# Patient Record
Sex: FEMALE | Race: WHITE | NOT HISPANIC OR LATINO | Employment: UNEMPLOYED | URBAN - METROPOLITAN AREA
[De-identification: names, ages, dates, MRNs, and addresses within clinical notes are randomized per-mention and may not be internally consistent; named-entity substitution may affect disease eponyms.]

---

## 2025-02-10 DIAGNOSIS — K91.2 POSTSURGICAL MALABSORPTION: ICD-10-CM

## 2025-02-10 DIAGNOSIS — E66.811 OBESITY, CLASS I, BMI 30-34.9: ICD-10-CM

## 2025-02-10 DIAGNOSIS — E66.01 MORBID (SEVERE) OBESITY DUE TO EXCESS CALORIES (HCC): ICD-10-CM

## 2025-02-10 DIAGNOSIS — R63.5 ABNORMAL WEIGHT GAIN: Primary | ICD-10-CM

## 2025-02-19 ENCOUNTER — OFFICE VISIT (OUTPATIENT)
Dept: BARIATRICS | Facility: CLINIC | Age: 40
End: 2025-02-19
Payer: COMMERCIAL

## 2025-02-19 VITALS
WEIGHT: 170.2 LBS | HEIGHT: 66 IN | OXYGEN SATURATION: 98 % | DIASTOLIC BLOOD PRESSURE: 60 MMHG | HEART RATE: 96 BPM | BODY MASS INDEX: 27.35 KG/M2 | SYSTOLIC BLOOD PRESSURE: 110 MMHG

## 2025-02-19 DIAGNOSIS — R63.5 WEIGHT GAIN FOLLOWING GASTRIC BYPASS SURGERY: ICD-10-CM

## 2025-02-19 DIAGNOSIS — Z98.84 WEIGHT GAIN FOLLOWING GASTRIC BYPASS SURGERY: ICD-10-CM

## 2025-02-19 DIAGNOSIS — Z98.84 BARIATRIC SURGERY STATUS: ICD-10-CM

## 2025-02-19 DIAGNOSIS — K91.2 POSTSURGICAL MALABSORPTION: ICD-10-CM

## 2025-02-19 DIAGNOSIS — G43.909 MIGRAINES: ICD-10-CM

## 2025-02-19 DIAGNOSIS — Z48.815 ENCOUNTER FOR SURGICAL AFTERCARE FOLLOWING SURGERY OF DIGESTIVE SYSTEM: Primary | ICD-10-CM

## 2025-02-19 PROCEDURE — 99214 OFFICE O/P EST MOD 30 MIN: CPT | Performed by: PHYSICIAN ASSISTANT

## 2025-02-19 RX ORDER — AMITRIPTYLINE HYDROCHLORIDE 50 MG/1
50 TABLET ORAL
COMMUNITY

## 2025-02-19 RX ORDER — GABAPENTIN 100 MG/1
100 CAPSULE ORAL DAILY
COMMUNITY
Start: 2024-11-25

## 2025-02-19 RX ORDER — CETIRIZINE HYDROCHLORIDE 10 MG/1
10 TABLET ORAL DAILY
COMMUNITY

## 2025-02-19 RX ORDER — FLUTICASONE PROPIONATE 50 MCG
1 SPRAY, SUSPENSION (ML) NASAL DAILY
COMMUNITY

## 2025-02-19 RX ORDER — MULTIVITAMIN
1 TABLET ORAL DAILY
COMMUNITY

## 2025-02-19 RX ORDER — TIZANIDINE HYDROCHLORIDE 4 MG/1
4 CAPSULE, GELATIN COATED ORAL 3 TIMES DAILY
COMMUNITY

## 2025-02-19 RX ORDER — OMEGA-3 FATTY ACIDS/FISH OIL 300-1000MG
600 CAPSULE ORAL
COMMUNITY

## 2025-02-19 RX ORDER — TOPIRAMATE 25 MG/1
25 TABLET, FILM COATED ORAL 2 TIMES DAILY
Qty: 180 TABLET | Refills: 0 | Status: SHIPPED | OUTPATIENT
Start: 2025-02-19 | End: 2025-05-20

## 2025-02-19 RX ORDER — GABAPENTIN 300 MG/1
1 CAPSULE ORAL DAILY
COMMUNITY
Start: 2024-12-27

## 2025-02-19 NOTE — ASSESSMENT & PLAN NOTE
-At risk for malabsorption of vitamins/minerals secondary to malabsorption and restriction of intake from bariatric surgery  -NOT Currently taking adequate postop bariatric surgery vitamin supplementation: OTC MVI with 9mg iron, - recommend change to Keith MVI with 45mg iron and calcium citrate 500mg TID (caution with too much oral iron d/t hx of constipation)    Hx of iron infusions    -Obtain CBC/Metabolic panel  -Patient received education about the importance of adhering to a lifelong supplementation regimen to avoid vitamin/mineral deficiencies

## 2025-02-19 NOTE — ASSESSMENT & PLAN NOTE
-s/p Moustapha-En-Y Gastric Bypass with in [place] in 2010 and s/p dx lap and reduction of internal hernia with Dr. Lua in 09/2024. Patient is struggling with weight regain and ready to get back on track.  They will work on diet and lifestyle changes - especially 30/60 rule, avoid mindless snacking, increase protein and fiber, avoid sugary drinks, avoid meal skipping, track/log, and exercise. Recommend consult with surgical RD, LCSW, and MWM. UGI to r/o GGF and evaluate anatomy.     Will trial Topamax given hx of Migraines and HS hunger.    Initial: 275lbs  Current: 170lbs  Oziel: 148lbs  Current BMI is Body mass index is 27.89 kg/m².    Tolerating a regular diet-yes  Eating at least 60 grams of protein per day-yes  Following 30/60 minute rule with liquids-yes  Drinking at least 64 ounces of fluid per day-no  Drinking carbonated beverages-no  Sufficient exercise-limited  Using NSAIDs regularly-yes - advised her of risks of Ulcers/perorated ulcer and to stop all use of NSAIDS  Using nicotine-no  Using alcohol-yes. Advised about the risks of alcohol s/p bariatric surgery and recommend avoiding all alcohol    Topiramate Instructions:  - Begin with 25mg once daily in the evening for the 1st week then start taking 25mg in the morning and evening  - Take medication at the same time everyday.  - Stay well hydrated to avoid overheating  - May reduce the effectiveness of hormonal birth control - backup method such as condoms recommended to avoid pregnancy    Reviewed the potential side effects of topiramate (Topamax) which may include - numbness or tingling, difficulty with word finding, metabolic acidosis, occurrence of gallstones and kidney stones, glaucoma, fatigue, upper respiratory infection symptoms, depression/anxiety, changes in taste, abdominal upset/heartburn, and trouble sleeping

## 2025-02-19 NOTE — PROGRESS NOTES
Assessment/Plan:    Encounter for surgical aftercare following surgery of digestive system  -s/p Moustapha-En-Y Gastric Bypass with in [place] in 2010 and s/p dx lap and reduction of internal hernia with Dr. Lua in 09/2024. Patient is struggling with weight regain and ready to get back on track.  They will work on diet and lifestyle changes - especially 30/60 rule, avoid mindless snacking, increase protein and fiber, avoid sugary drinks, avoid meal skipping, track/log, and exercise. Recommend consult with surgical RD, LCSW, and MWM. UGI to r/o GGF and evaluate anatomy.     Will trial Topamax given hx of Migraines and HS hunger.    Initial: 275lbs  Current: 170lbs  Oziel: 148lbs  Current BMI is Body mass index is 27.89 kg/m².    Tolerating a regular diet-yes  Eating at least 60 grams of protein per day-yes  Following 30/60 minute rule with liquids-yes  Drinking at least 64 ounces of fluid per day-no  Drinking carbonated beverages-no  Sufficient exercise-limited  Using NSAIDs regularly-yes - advised her of risks of Ulcers/perorated ulcer and to stop all use of NSAIDS  Using nicotine-no  Using alcohol-yes. Advised about the risks of alcohol s/p bariatric surgery and recommend avoiding all alcohol    Topiramate Instructions:  - Begin with 25mg once daily in the evening for the 1st week then start taking 25mg in the morning and evening  - Take medication at the same time everyday.  - Stay well hydrated to avoid overheating  - May reduce the effectiveness of hormonal birth control - backup method such as condoms recommended to avoid pregnancy    Reviewed the potential side effects of topiramate (Topamax) which may include - numbness or tingling, difficulty with word finding, metabolic acidosis, occurrence of gallstones and kidney stones, glaucoma, fatigue, upper respiratory infection symptoms, depression/anxiety, changes in taste, abdominal upset/heartburn, and trouble sleeping      Postsurgical malabsorption  -At risk for  malabsorption of vitamins/minerals secondary to malabsorption and restriction of intake from bariatric surgery  -NOT Currently taking adequate postop bariatric surgery vitamin supplementation: OTC MVI with 9mg iron, - recommend change to Keith MVI with 45mg iron and calcium citrate 500mg TID (caution with too much oral iron d/t hx of constipation)    Hx of iron infusions    -Obtain CBC/Metabolic panel  -Patient received education about the importance of adhering to a lifelong supplementation regimen to avoid vitamin/mineral deficiencies       Migraines  -on amitriptyline   -Will trial Topamax for HS hunger        Diagnoses and all orders for this visit:    Encounter for surgical aftercare following surgery of digestive system    Postsurgical malabsorption    Bariatric surgery status  -     ECG 12 lead; Future    Weight gain following gastric bypass surgery  -     [place] UPPER GI UGI; Future    Migraines    Other orders  -     Ascorbic Acid (VITAMIN C PO); Take 250 mg by mouth 2 (two) times a day  -     amitriptyline (ELAVIL) 50 mg tablet; Take 50 mg by mouth daily at bedtime  -     fluticasone (FLONASE) 50 mcg/act nasal spray; 1 spray into each nostril daily  -     gabapentin (NEURONTIN) 100 mg capsule; Take 100 mg by mouth daily  -     gabapentin (NEURONTIN) 300 mg capsule; Take 1 capsule by mouth in the morning  -     Ibuprofen 200 MG CAPS; Take 600 mg by mouth  -     Multiple Vitamin (multivitamin) tablet; Take 1 tablet by mouth daily  -     cetirizine (ZyrTEC) 10 mg tablet; Take 10 mg by mouth daily  -     TiZANidine (ZANAFLEX) 4 MG capsule; Take 4 mg by mouth 3 (three) times a day  -     MAGNESIUM CITRATE PO; Take by mouth  -     Probiotic Product (PROBIOTIC BLEND PO); Take by mouth  -     VITAMIN D, CHOLECALCIFEROL, PO; Take by mouth  -     Docusate Calcium (STOOL SOFTENER PO); Take by mouth  -     patient supplied medication; Colagen & peptides  -     ELDERBERRY PO; Take by mouth  -     Multiple Vitamins-Minerals  (AIRBORNE ELDERBERRY PO); Take by mouth  -     TURMERIC PO; Take by mouth          Subjective:      Patient ID: Holly Duran is a 39 y.o. female.    -s/p Moustapha-En-Y Gastric Bypass with in [place] in 2010 and s/p dx lap and reduction of internal hernia with Dr. Lua in 09/2024. Presents to the office today to establish care and for weight regain. Tolerating diet without issues; denies N/V, dysphagia, reflux. She lost 127lbs s/p surgery and maintained this weight loss until she had 2 children and now up 22lbs from her radha. Despite eating healthy and tracking/logging she has been unable to lose weight. She is limited with exercise d/t ankle injury. She experiences hunger worse at night.     Hx of IBS - constipation well controlled now with HS natural calm magnesium.    She has migraine headaches - on amitriptyline. Patient denies personal history of kidney stones, galucoma, or pancreatitis. Patient also denies personal and family history of medullary thyroid cancer and multiple endocrine neoplasia type 2 (MEN 2 tumor).       Diet Recall:   B - 2-3 cups coffee with half/half and stevia  Snack - egg and cheese  L - protein bar or green salad with chicken or tuna   Snack - almonds handful or berries or Greek yogurt  D - Tacos with ground meat and cheese or grilled chicken wrap and Bengali onion soup  HS - SF ice cream and scoop of PB    Fluids - 32oz water, 2-3 cups coffee, glass wine or mixed drinks few times a week    The following portions of the patient's history were reviewed and updated as appropriate: allergies, current medications, past family history, past medical history, past social history, past surgical history and problem list.    Review of Systems   Constitutional:  Positive for unexpected weight change (weight regain). Negative for chills and fever.   HENT:  Negative for trouble swallowing.    Respiratory:  Negative for cough and shortness of breath.    Cardiovascular:  Negative for chest pain and  "palpitations.   Gastrointestinal:  Negative for abdominal pain, constipation, diarrhea, nausea and vomiting.   Musculoskeletal:  Positive for arthralgias.   Neurological:  Negative for dizziness.   Psychiatric/Behavioral:          Denies anxiety and depression         Objective:      /60 (BP Location: Right arm, Patient Position: Sitting, Cuff Size: Standard)   Pulse 96   Ht 5' 5.5\" (1.664 m)   Wt 77.2 kg (170 lb 3.2 oz)   LMP 01/14/2025 (Exact Date)   SpO2 98%   BMI 27.89 kg/m²     Colonoscopy-Not applicable       Physical Exam  Vitals reviewed.   Constitutional:       General: She is not in acute distress.     Appearance: She is well-developed.   HENT:      Head: Normocephalic and atraumatic.   Eyes:      General: No scleral icterus.  Cardiovascular:      Rate and Rhythm: Normal rate and regular rhythm.   Pulmonary:      Effort: Pulmonary effort is normal. No respiratory distress.   Abdominal:      General: There is no distension.      Palpations: Abdomen is soft.      Tenderness: There is no abdominal tenderness.   Skin:     General: Skin is warm and dry.   Neurological:      Mental Status: She is alert.   Psychiatric:         Mood and Affect: Mood normal.         Behavior: Behavior normal.           BARRIERS: none identified    GOALS:   Continued/Maintain healthy weight loss with good nutrition intakes.  Adequate hydration with at least 64oz. fluid intake.  Normal vitamin and mineral levels.  Exercise as tolerated.    Follow-up in 1 year. We kindly ask that your arrive 15 minutes before your scheduled appointment time with your provider to allow our staff to room you, get your vital signs and update your chart.    Follow diet as discussed.      Get lab work done in the next 2 weeks.  You have been given a lab slip today.  Please call the office if you need a replacement.  It is recommended to check with your insurance BEFORE getting labs done to make sure they are covered by your policy.  Also, " please check with your PCP and other providers before getting labs to avoid duplicate labs. Make sure to HOLD any multivitamins that may contain biotin and any biotin supplements FOR 5 DAYS before any labs since it can affect the results.    Follow vitamin and mineral recommendations as reviewed with you.    Call our office if you have any problems with abdominal pain especially associated with fever, chills, nausea, vomiting or any other concerns.    All  Post-bariatric surgery patients should be aware that very small quantities of any alcohol can cause impairment and it is very possible not to feel the effect. The effect can be in the system for several hours.  It is also a stomach irritant.     It is advised to AVOID alcohol, Nonsteroidal antiinflammatory drugs (NSAIDS) and nicotine of all forms . Any of these can cause stomach irritation/pain.

## 2025-02-28 ENCOUNTER — LAB (OUTPATIENT)
Dept: LAB | Facility: HOSPITAL | Age: 40
End: 2025-02-28
Payer: COMMERCIAL

## 2025-02-28 ENCOUNTER — APPOINTMENT (OUTPATIENT)
Dept: LAB | Facility: HOSPITAL | Age: 40
End: 2025-02-28
Payer: COMMERCIAL

## 2025-02-28 ENCOUNTER — RESULTS FOLLOW-UP (OUTPATIENT)
Dept: BARIATRICS | Facility: CLINIC | Age: 40
End: 2025-02-28

## 2025-02-28 ENCOUNTER — HOSPITAL ENCOUNTER (OUTPATIENT)
Dept: RADIOLOGY | Facility: HOSPITAL | Age: 40
Discharge: HOME | End: 2025-02-28
Payer: COMMERCIAL

## 2025-02-28 DIAGNOSIS — E61.1 IRON DEFICIENCY: ICD-10-CM

## 2025-02-28 DIAGNOSIS — Z98.84 BARIATRIC SURGERY STATUS: ICD-10-CM

## 2025-02-28 DIAGNOSIS — K91.2 POSTSURGICAL MALABSORPTION: Primary | ICD-10-CM

## 2025-02-28 DIAGNOSIS — K91.2 POSTSURGICAL MALABSORPTION: ICD-10-CM

## 2025-02-28 DIAGNOSIS — E66.811 OBESITY, CLASS I, BMI 30-34.9: ICD-10-CM

## 2025-02-28 DIAGNOSIS — Z98.84 WEIGHT GAIN FOLLOWING GASTRIC BYPASS SURGERY: ICD-10-CM

## 2025-02-28 DIAGNOSIS — R63.5 WEIGHT GAIN FOLLOWING GASTRIC BYPASS SURGERY: ICD-10-CM

## 2025-02-28 LAB
25(OH)D3 SERPL-MCNC: 34.4 NG/ML (ref 30–100)
ALBUMIN SERPL BCG-MCNC: 4.4 G/DL (ref 3.5–5)
ALP SERPL-CCNC: 53 U/L (ref 34–104)
ALT SERPL W P-5'-P-CCNC: 15 U/L (ref 7–52)
ANION GAP SERPL CALCULATED.3IONS-SCNC: 10 MMOL/L (ref 4–13)
AST SERPL W P-5'-P-CCNC: 20 U/L (ref 13–39)
BASOPHILS # BLD AUTO: 0.06 THOUSANDS/ÂΜL (ref 0–0.1)
BASOPHILS NFR BLD AUTO: 1 % (ref 0–1)
BILIRUB SERPL-MCNC: 0.36 MG/DL (ref 0.2–1)
BUN SERPL-MCNC: 22 MG/DL (ref 5–25)
CALCIUM SERPL-MCNC: 8.9 MG/DL (ref 8.4–10.2)
CHLORIDE SERPL-SCNC: 105 MMOL/L (ref 96–108)
CHOLEST SERPL-MCNC: 196 MG/DL (ref ?–200)
CO2 SERPL-SCNC: 24 MMOL/L (ref 21–32)
CREAT SERPL-MCNC: 0.58 MG/DL (ref 0.6–1.3)
EOSINOPHIL # BLD AUTO: 0.12 THOUSAND/ÂΜL (ref 0–0.61)
EOSINOPHIL NFR BLD AUTO: 2 % (ref 0–6)
ERYTHROCYTE [DISTWIDTH] IN BLOOD BY AUTOMATED COUNT: 13.8 % (ref 11.6–15.1)
EST. AVERAGE GLUCOSE BLD GHB EST-MCNC: 120 MG/DL
FERRITIN SERPL-MCNC: 7 NG/ML (ref 11–307)
FOLATE SERPL-MCNC: >22.3 NG/ML
GFR SERPL CREATININE-BSD FRML MDRD: 118 ML/MIN/1.73SQ M
GLUCOSE P FAST SERPL-MCNC: 84 MG/DL (ref 65–99)
HBA1C MFR BLD: 5.8 %
HCT VFR BLD AUTO: 42.3 % (ref 34.8–46.1)
HDLC SERPL-MCNC: 82 MG/DL
HGB BLD-MCNC: 13.4 G/DL (ref 11.5–15.4)
IMM GRANULOCYTES # BLD AUTO: 0.01 THOUSAND/UL (ref 0–0.2)
IMM GRANULOCYTES NFR BLD AUTO: 0 % (ref 0–2)
IRON SATN MFR SERPL: 19 % (ref 15–50)
IRON SERPL-MCNC: 89 UG/DL (ref 50–212)
LDLC SERPL CALC-MCNC: 106 MG/DL (ref 0–100)
LYMPHOCYTES # BLD AUTO: 1.22 THOUSANDS/ÂΜL (ref 0.6–4.47)
LYMPHOCYTES NFR BLD AUTO: 21 % (ref 14–44)
MCH RBC QN AUTO: 29.1 PG (ref 26.8–34.3)
MCHC RBC AUTO-ENTMCNC: 31.7 G/DL (ref 31.4–37.4)
MCV RBC AUTO: 92 FL (ref 82–98)
MONOCYTES # BLD AUTO: 0.46 THOUSAND/ÂΜL (ref 0.17–1.22)
MONOCYTES NFR BLD AUTO: 8 % (ref 4–12)
NEUTROPHILS # BLD AUTO: 4.02 THOUSANDS/ÂΜL (ref 1.85–7.62)
NEUTS SEG NFR BLD AUTO: 68 % (ref 43–75)
NONHDLC SERPL-MCNC: 114 MG/DL
NRBC BLD AUTO-RTO: 0 /100 WBCS
PLATELET # BLD AUTO: 255 THOUSANDS/UL (ref 149–390)
PMV BLD AUTO: 11.1 FL (ref 8.9–12.7)
POTASSIUM SERPL-SCNC: 4.3 MMOL/L (ref 3.5–5.3)
PROT SERPL-MCNC: 6.9 G/DL (ref 6.4–8.4)
PTH-INTACT SERPL-MCNC: 36.9 PG/ML (ref 12–88)
RBC # BLD AUTO: 4.6 MILLION/UL (ref 3.81–5.12)
SODIUM SERPL-SCNC: 139 MMOL/L (ref 135–147)
TIBC SERPL-MCNC: 471.8 UG/DL (ref 250–450)
TRANSFERRIN SERPL-MCNC: 337 MG/DL (ref 203–362)
TRIGL SERPL-MCNC: 42 MG/DL (ref ?–150)
TSH SERPL DL<=0.05 MIU/L-ACNC: 2.88 UIU/ML (ref 0.45–4.5)
UIBC SERPL-MCNC: 383 UG/DL (ref 155–355)
VIT B12 SERPL-MCNC: 822 PG/ML (ref 180–914)
WBC # BLD AUTO: 5.89 THOUSAND/UL (ref 4.31–10.16)

## 2025-02-28 PROCEDURE — 36415 COLL VENOUS BLD VENIPUNCTURE: CPT

## 2025-02-28 PROCEDURE — 83540 ASSAY OF IRON: CPT

## 2025-02-28 PROCEDURE — 82746 ASSAY OF FOLIC ACID SERUM: CPT

## 2025-02-28 PROCEDURE — 82728 ASSAY OF FERRITIN: CPT

## 2025-02-28 PROCEDURE — 84425 ASSAY OF VITAMIN B-1: CPT

## 2025-02-28 PROCEDURE — 83550 IRON BINDING TEST: CPT

## 2025-02-28 PROCEDURE — 83036 HEMOGLOBIN GLYCOSYLATED A1C: CPT

## 2025-02-28 PROCEDURE — 74240 X-RAY XM UPR GI TRC 1CNTRST: CPT

## 2025-02-28 PROCEDURE — 85025 COMPLETE CBC W/AUTO DIFF WBC: CPT

## 2025-02-28 PROCEDURE — 84443 ASSAY THYROID STIM HORMONE: CPT

## 2025-02-28 PROCEDURE — 80053 COMPREHEN METABOLIC PANEL: CPT

## 2025-02-28 PROCEDURE — 84630 ASSAY OF ZINC: CPT

## 2025-02-28 PROCEDURE — 93005 ELECTROCARDIOGRAM TRACING: CPT

## 2025-02-28 PROCEDURE — 83970 ASSAY OF PARATHORMONE: CPT

## 2025-02-28 PROCEDURE — 84590 ASSAY OF VITAMIN A: CPT

## 2025-02-28 PROCEDURE — 82607 VITAMIN B-12: CPT

## 2025-02-28 PROCEDURE — 80061 LIPID PANEL: CPT

## 2025-02-28 PROCEDURE — 82306 VITAMIN D 25 HYDROXY: CPT

## 2025-03-01 LAB
ATRIAL RATE: 58 BPM
P AXIS: 6 DEGREES
PR INTERVAL: 142 MS
QRS AXIS: 51 DEGREES
QRSD INTERVAL: 92 MS
QT INTERVAL: 416 MS
QTC INTERVAL: 409 MS
T WAVE AXIS: 53 DEGREES
VENTRICULAR RATE: 58 BPM

## 2025-03-01 PROCEDURE — 93010 ELECTROCARDIOGRAM REPORT: CPT | Performed by: INTERNAL MEDICINE

## 2025-03-03 DIAGNOSIS — K91.2 POSTSURGICAL MALABSORPTION: ICD-10-CM

## 2025-03-03 DIAGNOSIS — E61.1 IRON DEFICIENCY: Primary | ICD-10-CM

## 2025-03-03 LAB — ZINC SERPL-MCNC: 81 UG/DL (ref 44–115)

## 2025-03-03 RX ORDER — SODIUM CHLORIDE 9 MG/ML
20 INJECTION, SOLUTION INTRAVENOUS ONCE
OUTPATIENT
Start: 2025-03-14

## 2025-03-03 NOTE — RESULT ENCOUNTER NOTE
Spoke with pt verbally. Relayed message from Anjali regarding recent labs. Pt stated she is having problems setting up her MyChart and will try calling number that was provided. Stated to pt I do have a [facility] help desk number to call . Pt stated she was driving and could not obtain the Help Desk number at this time. Pt requested labs to be mailed. Mailed lab results to pt.

## 2025-03-03 NOTE — RESULT ENCOUNTER NOTE
Please call Holly and let her know about her labs, thank you:    -Please notify the patient that their iron stores are low and it will be very difficult to improve iron stores or iron levels orally given their postsurgical malabsorption. I am placing orders for the infusion center to administer IV venofer iron weekly x 5 treatments. Please advise the patient of the potential side effects of IV Venofer, including, but not limited to nausea, headache, hypotension, tattooing of the skin, and anaphylactic reaction.  Please call the infusion center to notify them of the orders so they can obtain insurance prior-authorization and help schedule the patient asap. I have entered repeat CBC and iron panel and if needed B12 labs to be repeated in 3 months. Thank you!    Infusion Center numbers:  [facility]: [phone]    -HgbA1C in pre-DM range- we can consider starting metformin and continue to work on diet changes and f/u with RD and MWM    Your vitamin D is low normal:  Please add an additional 2,000 IU of Vitamin D3 per day in addition to the 3,000IU of Vitamin D you are currently taking in your Bariatric MVI.  Vitamin D is best absorbed with food, so take it with your largest meal of the day. It can be found inexpensively over the counter at your pharmacy or online.    Remember to also take 1500 mg calcium citrate per day total (taken 500 mg at a time, three times per day). It is very important that you separate each dose by at least 2 hours and take calcium at least 2 hours apart from MVI and iron.

## 2025-03-05 LAB
VIT A SERPL-MCNC: 43.6 UG/DL (ref 18.9–57.3)
VIT B1 BLD-SCNC: 133.9 NMOL/L (ref 66.5–200)

## 2025-03-27 DIAGNOSIS — B37.2 INTERTRIGINOUS CANDIDIASIS: Primary | ICD-10-CM

## 2025-03-27 RX ORDER — NYSTATIN 100000 U/G
CREAM TOPICAL 2 TIMES DAILY
Qty: 30 G | Refills: 2 | Status: SHIPPED | OUTPATIENT
Start: 2025-03-27

## 2025-03-31 ENCOUNTER — OFFICE VISIT (OUTPATIENT)
Dept: BARIATRICS | Facility: CLINIC | Age: 40
End: 2025-03-31
Payer: COMMERCIAL

## 2025-03-31 ENCOUNTER — CLINICAL SUPPORT (OUTPATIENT)
Dept: BARIATRICS | Facility: CLINIC | Age: 40
End: 2025-03-31

## 2025-03-31 VITALS
HEART RATE: 101 BPM | BODY MASS INDEX: 27.23 KG/M2 | HEIGHT: 66 IN | DIASTOLIC BLOOD PRESSURE: 60 MMHG | WEIGHT: 169.4 LBS | SYSTOLIC BLOOD PRESSURE: 98 MMHG

## 2025-03-31 VITALS — WEIGHT: 169.4 LBS | BODY MASS INDEX: 27.23 KG/M2 | HEIGHT: 66 IN

## 2025-03-31 VITALS — WEIGHT: 169.4 LBS | BODY MASS INDEX: 27.76 KG/M2

## 2025-03-31 DIAGNOSIS — E66.3 OVERWEIGHT WITH BODY MASS INDEX (BMI) OF 27 TO 27.9 IN ADULT: Primary | ICD-10-CM

## 2025-03-31 DIAGNOSIS — Z48.815 ENCOUNTER FOR SURGICAL AFTERCARE FOLLOWING SURGERY OF DIGESTIVE SYSTEM: Primary | ICD-10-CM

## 2025-03-31 DIAGNOSIS — K91.2 POSTSURGICAL MALABSORPTION: ICD-10-CM

## 2025-03-31 DIAGNOSIS — G43.909 MIGRAINES: ICD-10-CM

## 2025-03-31 DIAGNOSIS — K91.2 POSTSURGICAL MALABSORPTION: Primary | ICD-10-CM

## 2025-03-31 PROCEDURE — RECHECK

## 2025-03-31 PROCEDURE — 99215 OFFICE O/P EST HI 40 MIN: CPT | Performed by: NURSE PRACTITIONER

## 2025-03-31 NOTE — ASSESSMENT & PLAN NOTE
- Discussed options of [facility]-Intensive Lifestyle Intervention Program, Very Low Calorie Diet-VLCD, and Conservative Program and the role of weight loss medications.  - Patient is interested in pursuing Conservative Program and follow up visits with medical weight management provider.  - Explained the importance of making lifestyle changes in addition to starting any anti-obesity medications.   - Initial weight loss goal of 5-10% weight loss for improved health. Weight loss can improve patient's co-morbid conditions and/or prevent weight-related complications.  - Weight is not at goal and patient has been unable to achieve a meaningful weight loss above 5% using various programs and tools for more than 6 months  - Labs reviewed from 2/2025    General Recommendations:  Nutrition:  Eat breakfast daily.  Do not skip meals.      Food log (ie.) www.myfitnesspal.com, sparkpeople.com, loseit.com, calorieking.com, etc.     Practice mindful eating.  Be sure to set aside time to eat, eat slowly, and savor your food.     Hydration:    At least 64oz of water daily.  No sugar sweetened beverages.  No juice (eat the fruit instead).     Exercise:  Studies have shown that the ideal exercise goal is somewhere between 150 to 300 minutes of moderate intensity exercise a week.  Start with exercising 10 minutes every other day and gradually increase physical activity with a goal of at least 150 minutes of moderate intensity exercise a week, divided over at least 3 days a week.  An example of this would be exercising 30 minutes a day, 5 days a week.  Resistance training can increase muscle mass and increase our resting metabolic rate.   FULL BODY resistance training is recommended 2-3 times a week.  Do not do this on consecutive days to allow for muscle recovery.     Aim for a bare minimum 5000 steps, even on days you do not exercise.     Monitoring:   Weigh yourself daily.  If this causes undue stress, then just weigh yourself once  a week.  Weigh yourself the same time of the day with the same amount of clothing on.  Preferably this should be done after waking up, before you eat, and with no clothing or minimal clothing on.     Specific Goals:  Patient lifestyle habits were reviewed.  Nutrition was discussed and patient will be meeting with a dietitian to better structure her nutrition.  Suspect that she could be over consuming calories which may be leading to her weight loss.  Discussed how patient has healthy choices and is focusing on protein but may be over consuming to lose weight.  Hopefully with some changes in structure she can get back on track.  Activity was discussed and she was encouraged to continue with her walks but to incorporate strength training to start to build muscle mass.  She was encouraged to start slow with light hand weights or resistance bands 1 to 2 days a week.  Medications were discussed patient will continue with topiramate 25 mg that she is seeing improvement in her migraines as well as her appetite.  She may consider decreasing her amitriptyline to 10 mg as she felt that that does cause less side effects.  She will further follow-up with her primary care to discuss this adjustment.  Patient is not currently a candidate for GLP-1 medications  Phentermine to be avoided due to elevated heart rate.  Patient will follow-up in the office in 3 months to monitor her weight loss.

## 2025-03-31 NOTE — PROGRESS NOTES
Assessment/Plan:    Overweight with body mass index (BMI) of 27 to 27.9 in adult  - Discussed options of HealthyCORE-Intensive Lifestyle Intervention Program, Very Low Calorie Diet-VLCD, and Conservative Program and the role of weight loss medications.  - Patient is interested in pursuing Conservative Program and follow up visits with medical weight management provider.  - Explained the importance of making lifestyle changes in addition to starting any anti-obesity medications.   - Initial weight loss goal of 5-10% weight loss for improved health. Weight loss can improve patient's co-morbid conditions and/or prevent weight-related complications.  - Weight is not at goal and patient has been unable to achieve a meaningful weight loss above 5% using various programs and tools for more than 6 months  - Labs reviewed from 2/2025    General Recommendations:  Nutrition:  Eat breakfast daily.  Do not skip meals.      Food log (ie.) www.globa.ly.com, sparkpeople.com, loseit.com, calorieking.com, etc.     Practice mindful eating.  Be sure to set aside time to eat, eat slowly, and savor your food.     Hydration:    At least 64oz of water daily.  No sugar sweetened beverages.  No juice (eat the fruit instead).     Exercise:  Studies have shown that the ideal exercise goal is somewhere between 150 to 300 minutes of moderate intensity exercise a week.  Start with exercising 10 minutes every other day and gradually increase physical activity with a goal of at least 150 minutes of moderate intensity exercise a week, divided over at least 3 days a week.  An example of this would be exercising 30 minutes a day, 5 days a week.  Resistance training can increase muscle mass and increase our resting metabolic rate.   FULL BODY resistance training is recommended 2-3 times a week.  Do not do this on consecutive days to allow for muscle recovery.     Aim for a bare minimum 5000 steps, even on days you do not exercise.     Monitoring:    Weigh yourself daily.  If this causes undue stress, then just weigh yourself once a week.  Weigh yourself the same time of the day with the same amount of clothing on.  Preferably this should be done after waking up, before you eat, and with no clothing or minimal clothing on.     Specific Goals:  Patient lifestyle habits were reviewed.  Nutrition was discussed and patient will be meeting with a dietitian to better structure her nutrition.  Suspect that she could be over consuming calories which may be leading to her weight loss.  Discussed how patient has healthy choices and is focusing on protein but may be over consuming to lose weight.  Hopefully with some changes in structure she can get back on track.  Activity was discussed and she was encouraged to continue with her walks but to incorporate strength training to start to build muscle mass.  She was encouraged to start slow with light hand weights or resistance bands 1 to 2 days a week.  Medications were discussed patient will continue with topiramate 25 mg that she is seeing improvement in her migraines as well as her appetite.  She may consider decreasing her amitriptyline to 10 mg as she felt that that does cause less side effects.  She will further follow-up with her primary care to discuss this adjustment.  Patient is not currently a candidate for GLP-1 medications  Phentermine to be avoided due to elevated heart rate.  Patient will follow-up in the office in 3 months to monitor her weight loss.    Migraines  On amitriptyline 50 mg -may reduce back to 10 mg  Started on topiramate 2 weeks ago and is currently on 25 mg twice daily -will adjust as needed at next visit    Postsurgical malabsorption  Will continue to follow annually with bariatric surgery team         Holly was seen today for consult.    Diagnoses and all orders for this visit:    Overweight with body mass index (BMI) of 27 to 27.9 in adult    Migraines    Postsurgical malabsorption      "      Total time spent reviewing chart, interviewing patient, examining patient, discussing plan, answering all questions, and documentin min, with >50% face-to-face time spent counseling patient on nonsurgical interventions for the treatment of excess weight. Discussed in detail nonsurgical options including intensive lifestyle intervention program, very low-calorie diet program and conservative program.  Discussed the role of weight loss medications.  Counseled patient on diet behavior and exercise modification for weight loss.    Follow up in approximately 3 months with Non-Surgical Physician/Advanced Practitioner.    Subjective:   Chief Complaint   Patient presents with    Consult     Pt is here for MWM consult. Waist - 34.5\"       Patient ID: Holly Duran  is a 39 y.o. female with excess weight/obesity here to pursue weight management.  Previous notes and records have been reviewed.    Past Medical History:   Diagnosis Date    Anxiety     Fibromyalgia      Past Surgical History:   Procedure Laterality Date    FOREARM SURGERY Left     cassie    GASTRIC BYPASS      HERNIA REPAIR      bowel obstuction    IVC FILTER INSERTION      KNEE SURGERY Left     screw removal    LEG SURGERY Left     femur cassie    LEG SURGERY Right     Tib & Fib cassie    WRIST SURGERY Right     pins       HPI:  Wt Readings from Last 20 Encounters:   25 76.8 kg (169 lb 6.4 oz)   25 77.2 kg (170 lb 3.2 oz)   16 90.7 kg (200 lb)   11/13/15 88.5 kg (195 lb)       Patient presents today to medical weight management office for consult.  Patient is status post bariatric surgery in  and had maintained her weight around 190 pounds for many years.  After her first pregnancy in  she found herself maintaining around 205 pounds.  She went on a keto based diet along with her  and lost 55 pounds.  After her second pregnancy 4 years ago she was able to maintain her weight around 150 pounds.  She has noticed within the past 6 " months her weight has started to increase.  She has tried going back to keto a few times without any weight loss.  She feels frustrated that she has been trying to maintain her healthy nutrition but has not seen any weight loss.    She does admit that her activity level is not where she would like it to be due to her previous surgeries and chronic pain.  She wants to try to build more muscle mass but struggles with finding time with her youngest child at home.  She goes to the gym and walks on the track a few times a week.  Patient also struggles with ADHD and will be starting Lions toña supplement to help the symptoms.  At her last visit with the bariatric surgery team she was placed on topiramate to help with both migraines as well as her urge to snack throughout the day.  She is currently on 25 mg twice daily and is tolerating without any side effects.  She does think it may be helping with her migraines and her appetite.  She is hopeful this treatment will get her back on track.  She also has an upcoming appointment with the dietitian and  to better balance her lifestyle habits.      Surgery: Moustapha-En-Y Gastric Bypass with in [place] in 2010 and s/p dx lap and reduction of internal hernia with Dr. Lua in 09/2024   Initial weight: 275 lbs  Oziel: 148 lbs    Starting MWM weight: 169.4 lbs  Starting BMI: 27.76  Starting Waist Measurement: 34.5 in  Goal weight: 150s lbs    Diet Recall:   B - 2-3 cups coffee with half/half and stevia  Snack - egg and cheese  L - protein bar or green salad with chicken or tuna   Snack - almonds handful or berries or Greek yogurt  D - Tacos with ground meat and cheese or grilled chicken wrap and Kinyarwanda onion soup  HS - SF ice cream and scoop of PB  Take out frequency: not often    Hydration: coffee, water, very rare soda  Alcohol: occasional wine or cocktail  Smoking: no  Exercise: limited with body pains - walks on track  Occupation: stay at home mom  Sleep: fair      The  "following portions of the patient's history were reviewed and updated as appropriate: allergies, current medications, past family history, past medical history, past social history, past surgical history, and problem list.    Family History   Problem Relation Age of Onset    Breast cancer Mother     Thyroid disease Maternal Aunt     Pancreatic cancer Paternal Grandmother         Review of Systems   Constitutional:  Negative for fatigue.   HENT:  Negative for sore throat.    Respiratory:  Negative for cough and shortness of breath.    Cardiovascular:  Negative for chest pain, palpitations and leg swelling.   Gastrointestinal:  Negative for abdominal pain, constipation, diarrhea and nausea.   Genitourinary:  Negative for dysuria.   Musculoskeletal:  Negative for arthralgias and back pain.   Skin:  Negative for rash.   Neurological:  Negative for headaches.   Psychiatric/Behavioral:  Negative for dysphoric mood. The patient is not nervous/anxious.        Objective:  BP 98/60 (Patient Position: Sitting, Cuff Size: Large)   Pulse 101   Ht 5' 5.5\" (1.664 m)   Wt 76.8 kg (169 lb 6.4 oz)   LMP 02/22/2025 (Approximate)   BMI 27.76 kg/m²     Physical Exam  Vitals and nursing note reviewed.   Constitutional:       Appearance: Normal appearance. She is obese.   HENT:      Head: Normocephalic.   Pulmonary:      Effort: Pulmonary effort is normal.   Neurological:      General: No focal deficit present.      Mental Status: She is alert and oriented to person, place, and time.   Psychiatric:         Mood and Affect: Mood normal.         Behavior: Behavior normal.         Thought Content: Thought content normal.         Judgment: Judgment normal.              Labs and Imaging  Recent labs and imaging have been personally reviewed.  Lab Results   Component Value Date    WBC 5.89 02/28/2025    HGB 13.4 02/28/2025    HCT 42.3 02/28/2025    MCV 92 02/28/2025     02/28/2025     Lab Results   Component Value Date    SODIUM 139 " 02/28/2025    K 4.3 02/28/2025     02/28/2025    CO2 24 02/28/2025    AGAP 10 02/28/2025    BUN 22 02/28/2025    CREATININE 0.58 (L) 02/28/2025    GLUF 84 02/28/2025    CALCIUM 8.9 02/28/2025    AST 20 02/28/2025    ALT 15 02/28/2025    ALKPHOS 53 02/28/2025    TP 6.9 02/28/2025    TBILI 0.36 02/28/2025    EGFR 118 02/28/2025     Lab Results   Component Value Date    HGBA1C 5.8 (H) 02/28/2025     Lab Results   Component Value Date    UGC8MJVETHZH 2.875 02/28/2025     Lab Results   Component Value Date    CHOLESTEROL 196 02/28/2025     Lab Results   Component Value Date    HDL 82 02/28/2025     Lab Results   Component Value Date    TRIG 42 02/28/2025     Lab Results   Component Value Date    LDLCALC 106 (H) 02/28/2025

## 2025-03-31 NOTE — PROGRESS NOTES
Bariatric Follow Up Nutrition Note    Type of surgery  Gastric bypass: laparoscopic  Surgery Date: 2010  15 years  post-op  Surgeon: in [place]  S/p dx lap and reduction of internal hernia w/Dr Lua in Sept 2024    Nutrition Assessment   Holly Duran  39 y.o.  female  Last menstrual period 02/22/2025.  Weight (last 2 days)       Date/Time Weight    03/31/25 1529 76.8 (169.4)          Body mass index is 27.76 kg/m².      Weight on Day of Weight Loss Surgery: 275#  Weight in (lb) to have BMI = 25: 152#  Pre-Op Excess Wt: 123  Maintained about 190, got pregnant then up to 205#, did keto + covid and got down to 145#  August of 2021 was pregnant again and only gained the 35# and lost  Hernia bowel obstruction 2024  Goal: 140-145#  Post-Op Wt Loss: 106#/ 86% EBWL in 15 year(s)    Review of History and Medications   Past Medical History:   Diagnosis Date    Anxiety     Fibromyalgia      Past Surgical History:   Procedure Laterality Date    FOREARM SURGERY Left     cassie    GASTRIC BYPASS      HERNIA REPAIR      bowel obstuction    IVC FILTER INSERTION      KNEE SURGERY Left     screw removal    LEG SURGERY Left     femur cassie    LEG SURGERY Right     Tib & Fib cassie    WRIST SURGERY Right     pins     Social History     Socioeconomic History    Marital status: /Civil Union     Spouse name: Not on file    Number of children: Not on file    Years of education: Not on file    Highest education level: Not on file   Occupational History    Not on file   Tobacco Use    Smoking status: Never    Smokeless tobacco: Never   Vaping Use    Vaping status: Never Used   Substance and Sexual Activity    Alcohol use: Yes     Comment: ocassioanlly    Drug use: Never    Sexual activity: Not on file   Other Topics Concern    Not on file   Social History Narrative    Not on file     Social Drivers of Health     Financial Resource Strain: Not on file   Food Insecurity: Not on file   Transportation Needs: Not on file   Physical Activity: Not on  file   Stress: Not on file   Social Connections: Not on file   Intimate Partner Violence: Not on file   Housing Stability: Not on file       Current Outpatient Medications:     amitriptyline (ELAVIL) 50 mg tablet, Take 50 mg by mouth daily at bedtime, Disp: , Rfl:     Ascorbic Acid (VITAMIN C PO), Take 250 mg by mouth 2 (two) times a day, Disp: , Rfl:     cetirizine (ZyrTEC) 10 mg tablet, Take 10 mg by mouth daily, Disp: , Rfl:     Docusate Calcium (STOOL SOFTENER PO), Take by mouth (Patient not taking: Reported on 3/31/2025), Disp: , Rfl:     ELDERBERRY PO, Take by mouth, Disp: , Rfl:     fluticasone (FLONASE) 50 mcg/act nasal spray, 1 spray into each nostril daily, Disp: , Rfl:     gabapentin (NEURONTIN) 100 mg capsule, Take 100 mg by mouth daily, Disp: , Rfl:     gabapentin (NEURONTIN) 300 mg capsule, Take 1 capsule by mouth in the morning, Disp: , Rfl:     Ibuprofen 200 MG CAPS, Take 600 mg by mouth (Patient not taking: Reported on 3/31/2025), Disp: , Rfl:     MAGNESIUM CITRATE PO, Take by mouth, Disp: , Rfl:     Multiple Vitamin (multivitamin) tablet, Take 1 tablet by mouth daily, Disp: , Rfl:     Multiple Vitamins-Minerals (AIRBORNE ELDERBERRY PO), Take by mouth, Disp: , Rfl:     nystatin (MYCOSTATIN) cream, Apply topically 2 (two) times a day, Disp: 30 g, Rfl: 2    patient supplied medication, Colagen & peptides, Disp: , Rfl:     Probiotic Product (PROBIOTIC BLEND PO), Take by mouth, Disp: , Rfl:     TiZANidine (ZANAFLEX) 4 MG capsule, Take 4 mg by mouth 3 (three) times a day (Patient not taking: Reported on 3/31/2025), Disp: , Rfl:     topiramate (Topamax) 25 mg tablet, Take 1 tablet (25 mg total) by mouth 2 (two) times a day Take 1 tablet by mouth once daily in the evening for 1 week then increase to 1 tablet twice daily in the morning and evening, Disp: 180 tablet, Rfl: 0    TURMERIC PO, Take by mouth, Disp: , Rfl:     VITAMIN D, CHOLECALCIFEROL, PO, Take by mouth, Disp: , Rfl:     Food Intake and  Lifestyle Assessment   Food Intake Assessment completed via usual diet recall  Wake: 7:40am  Breakfast: coffee w/heavy cream (a keto thing) + stevia + collagen  Brings older daughter to school, home by 9am, and deals with the animals and by 10am will have a chaffle (cheese, eggs, ho, cinn, stevia) + almond butter + SF syrup  Snack: montilla meat stick, indiv pack of pistachios or atkins bar (140cals, 11gm)   Lunch: salad with protein or leftovers   Snack: same as of above   Dinner: chicken w/broccoli + cheese OR ground meat with seasoning OR   Snack: breyers carb smart ice cream 1 scoop + PB OR dark chocolate bar + 1 glass of wine  *was trying to add more fat to make things more keto    Beverage intake: water, coffee w/ heavy cream--4 cups per day, and alcohol  Diet texture/stage: regular  Protein supplement: Premier OR Orgain  Estimated protein intake per day: 60-75gm  Estimated fluid intake per day: a few glasses of wine per week, 4 coffee + heavy cream, matcha or water 1L   Meals eaten away from home: not often  Typical meal pattern: 3 meals per day and 2-4 snacks per day  Eating Behaviors: Consumption of high calorie/ high fat foods, Consumption of high calorie beverages, Frequent snacking/ grazing, Mindless eating, and Craves sweet foods    Food allergies or intolerances: pancakes, muffins,   Cultural or Uatsdin considerations: -    Vitamins:   Vitamin D3 2000IU  Cap multi  B12 1000mcg  Eric crisostomo  Starting iron infusions  Did get a sample of the Baripal from Anjali [facility]ckinney immune support herbs and tumeric    Physical Assessment  Nutrition Related Findings  Constipation    Physical Activity  Types of exercise: Walking 2x/week on the track 6 laps, 4000 steps per day  Current physical limitations: past injuries.     Psychosocial Assessment   Support systems: spouse and children friend(s) relative(s)  Socioeconomic factors: -    Nutrition Diagnosis  Diagnosis: Overweight / Obesity (NC-3.3)  Related to: Physical  inactivity and Excessive energy intake  As Evidenced by: BMI >25     Interventions and Teaching   Patient educated on post-op nutrition guidelines.       Patient educated and handouts provided.  Expected weight loss  Weight loss plateaus/ possibility of weight regain  Exercise  Nutrition considerations after surgery  Protein supplements  Meal planning and preparation  Appropriate carbohydrate, protein, and fat intake, and food/fluid choices to maximize safe weight loss, nutrient intake, and tolerance   Dietary and lifestyle changes  Possible problems with poor eating habits  Intuitive eating  Techniques for self monitoring and keeping daily food journal  Potential for food intolerance after surgery, and ways to deal with them including: lactose intolerance, nausea, reflux, vomiting, diarrhea, food intolerance, appetite changes, gas  Vitamin / Mineral supplementation of Multivitamin with minerals, Calcium, Vitamin B12, Iron, and Vitamin D    Education provided to: patient    Barriers to learning: No barriers identified    Readiness to change: preparation    Comprehension: verbalizes understanding     Expected Compliance: good    Pt presents today s/p RYGB with weight regain. She reports she has done keto in the past and started trying again but not seeing the same results.  Discussed with that wether she is in ketosis or not, if she is still over consuming calories she will not lose weight.  Pt downloaded the carb manager ki. Suggested to log her intake to better determine how many calories she is consuming.  Advised pt that 9257-1518 calories per day will assist in losing 1# per week without a set exercise regimen. With the high fat food choices and additives ie: heavy cream in 4 coffees through the day, PB, coconut oil, etc, the calories could easily be adding up.  Pt interested in a body comp therefore scheduled for April 11th. Pt will also log her food within that time and can review log.      Goals  Eliminate  sugar sweetened beverages--decrease coffee with heavy cream  Food journal via carb manager  Exercise 30 minutes 5 times per week--walk as tolerated.  Suggested strength training starting with body weight activities.  Eat 3 meals per day with protein at each meal  Eliminate mindless snacking--limit to 2 planned protein snacks per day  Body comp on April 11th     Time Spent:   30 Minutes

## 2025-03-31 NOTE — PROGRESS NOTES
Patient presents for post-op transfer patient visit, current weight 169.4lbs.    Eating behaviors/food choices: patient reports weight regain gradually since her surgery. She tried various diets in the past such as Keto which were effective at the time but no longer working. Patient reports difficulty balancing her own self care with that of her family, she has two young children and reports minimal support form her . She admits to having a diagnosis of ADHD that is not currently being treated, her daughter also may have ADHD and struggling with behaviors. Patient feels she has no time for herself, she asks her  for help but he either doesn't help the way she needs or doesn't follow through which adds to her stress. She reports not being able to sit down to have her meals, she is often restless, she doesn't feel she can take a break for herself. Discussed the impact that mood, undiagnosed mental health and stress can have on weight management. Encouraged patient to continue to talk with her  about her needs, prompting him and keeping lines of communication open. Suggested she seek out supports who can help with the kids to give her time to herself, to find those opportunities of self care otherwise her weight management may continue to be a struggle. Resources to connect to a therapist were provided, encouraged to have her ADHD or any other mental health treated for optimum weight management support.    Next Appointment:  with ANGELA on 4/11

## 2025-03-31 NOTE — ASSESSMENT & PLAN NOTE
On amitriptyline 50 mg -may reduce back to 10 mg  Started on topiramate 2 weeks ago and is currently on 25 mg twice daily -will adjust as needed at next visit

## 2025-04-02 ENCOUNTER — HOSPITAL ENCOUNTER (OUTPATIENT)
Dept: INFUSION CENTER | Facility: HOSPITAL | Age: 40
Discharge: HOME | End: 2025-04-02
Attending: SURGERY
Payer: COMMERCIAL

## 2025-04-02 VITALS
OXYGEN SATURATION: 100 % | HEART RATE: 98 BPM | TEMPERATURE: 98.4 F | RESPIRATION RATE: 18 BRPM | DIASTOLIC BLOOD PRESSURE: 71 MMHG | SYSTOLIC BLOOD PRESSURE: 121 MMHG

## 2025-04-02 DIAGNOSIS — E61.1 IRON DEFICIENCY: ICD-10-CM

## 2025-04-02 DIAGNOSIS — K91.2 POSTSURGICAL MALABSORPTION: Primary | ICD-10-CM

## 2025-04-02 PROCEDURE — 96365 THER/PROPH/DIAG IV INF INIT: CPT

## 2025-04-02 RX ORDER — SODIUM CHLORIDE 9 MG/ML
20 INJECTION, SOLUTION INTRAVENOUS ONCE
Status: COMPLETED | OUTPATIENT
Start: 2025-04-02 | End: 2025-04-02

## 2025-04-02 RX ORDER — SODIUM CHLORIDE 9 MG/ML
20 INJECTION, SOLUTION INTRAVENOUS ONCE
Status: CANCELLED | OUTPATIENT
Start: 2025-04-11

## 2025-04-02 RX ADMIN — IRON SUCROSE 300 MG: 20 INJECTION, SOLUTION INTRAVENOUS at 12:17

## 2025-04-02 RX ADMIN — SODIUM CHLORIDE 20 ML/HR: 0.9 INJECTION, SOLUTION INTRAVENOUS at 12:16

## 2025-04-02 NOTE — PLAN OF CARE
Problem: Potential for Falls  Goal: Patient will remain free of falls  Description: INTERVENTIONS:- Educate patient/family on patient safety including physical limitations- Instruct patient to call for assistance with activity - Keep Call bell within reach- Keep care items and personal belongings within reach- Initiate and maintain comfort rounds-   Outcome: Progressing

## 2025-04-02 NOTE — PROGRESS NOTES
Holly Duran  tolerated treatment well with no complications.      Holly Duran is aware of future appt on 4/11 at 1030.     AVS printed and given to Holly Duran:  Declined by Holly Duran

## 2025-04-11 ENCOUNTER — CLINICAL SUPPORT (OUTPATIENT)
Dept: BARIATRICS | Facility: CLINIC | Age: 40
End: 2025-04-11

## 2025-04-11 ENCOUNTER — HOSPITAL ENCOUNTER (OUTPATIENT)
Dept: INFUSION CENTER | Facility: HOSPITAL | Age: 40
End: 2025-04-11
Attending: SURGERY
Payer: COMMERCIAL

## 2025-04-11 VITALS
OXYGEN SATURATION: 98 % | HEART RATE: 97 BPM | RESPIRATION RATE: 18 BRPM | SYSTOLIC BLOOD PRESSURE: 110 MMHG | TEMPERATURE: 97.8 F | DIASTOLIC BLOOD PRESSURE: 69 MMHG

## 2025-04-11 VITALS — HEIGHT: 66 IN | BODY MASS INDEX: 27 KG/M2 | WEIGHT: 168 LBS

## 2025-04-11 DIAGNOSIS — K91.2 POSTSURGICAL MALABSORPTION: Primary | ICD-10-CM

## 2025-04-11 DIAGNOSIS — E61.1 IRON DEFICIENCY: ICD-10-CM

## 2025-04-11 PROCEDURE — RECHECK

## 2025-04-11 PROCEDURE — 96365 THER/PROPH/DIAG IV INF INIT: CPT

## 2025-04-11 PROCEDURE — WEIGHT

## 2025-04-11 RX ORDER — SODIUM CHLORIDE 9 MG/ML
20 INJECTION, SOLUTION INTRAVENOUS ONCE
Status: CANCELLED | OUTPATIENT
Start: 2025-04-15

## 2025-04-11 RX ORDER — SODIUM CHLORIDE 9 MG/ML
20 INJECTION, SOLUTION INTRAVENOUS ONCE
Status: COMPLETED | OUTPATIENT
Start: 2025-04-11 | End: 2025-04-11

## 2025-04-11 RX ADMIN — SODIUM CHLORIDE 20 ML/HR: 0.9 INJECTION, SOLUTION INTRAVENOUS at 10:34

## 2025-04-11 RX ADMIN — IRON SUCROSE 300 MG: 20 INJECTION, SOLUTION INTRAVENOUS at 10:35

## 2025-04-11 NOTE — PLAN OF CARE
Problem: Potential for Falls  Goal: Patient will remain free of falls  Description: INTERVENTIONS:- Educate patient/family on patient safety including physical limitations- Instruct patient to call for assistance with activity - Keep Call bell within reach Keep care items and personal belongings within reach  Outcome: Progressing

## 2025-04-11 NOTE — PROGRESS NOTES
"Body composition completed utilizing SECA scale and results reviewed with patient.    Question accuracy of results because scale kept saying \"poor connection with sensors\".  Did get some results that were discussed with pt, but will have pt come back for a repeat in a few weeks (at no charge) to compare and likely better assess.     "

## 2025-04-11 NOTE — PROGRESS NOTES
Holly Duran  tolerated treatment well with no complications.      Holly Duran is aware of future appt on 4/15 at 0900.     AVS printed and given to Holly Duran:  Yes

## 2025-04-15 ENCOUNTER — HOSPITAL ENCOUNTER (OUTPATIENT)
Dept: INFUSION CENTER | Facility: HOSPITAL | Age: 40
Discharge: HOME | End: 2025-04-15
Attending: SURGERY
Payer: COMMERCIAL

## 2025-04-15 VITALS
TEMPERATURE: 97.7 F | SYSTOLIC BLOOD PRESSURE: 106 MMHG | DIASTOLIC BLOOD PRESSURE: 61 MMHG | OXYGEN SATURATION: 96 % | RESPIRATION RATE: 18 BRPM | HEART RATE: 86 BPM

## 2025-04-15 DIAGNOSIS — E61.1 IRON DEFICIENCY: ICD-10-CM

## 2025-04-15 DIAGNOSIS — K91.2 POSTSURGICAL MALABSORPTION: Primary | ICD-10-CM

## 2025-04-15 RX ORDER — SODIUM CHLORIDE 9 MG/ML
20 INJECTION, SOLUTION INTRAVENOUS ONCE
OUTPATIENT
Start: 2025-04-23

## 2025-04-15 RX ORDER — SODIUM CHLORIDE 9 MG/ML
20 INJECTION, SOLUTION INTRAVENOUS ONCE
Status: COMPLETED | OUTPATIENT
Start: 2025-04-15 | End: 2025-04-15

## 2025-04-15 RX ADMIN — IRON SUCROSE 300 MG: 20 INJECTION, SOLUTION INTRAVENOUS at 09:47

## 2025-04-15 RX ADMIN — SODIUM CHLORIDE 20 ML/HR: 9 INJECTION, SOLUTION INTRAVENOUS at 09:46

## 2025-04-15 NOTE — PROGRESS NOTES
Holly Duran  tolerated treatment well with no complications.      Holly Duran is aware of future appt on 4/23 at 1400.     AVS printed and given to Holly Duran:    No (Declined by Holly Duran)

## 2025-04-23 ENCOUNTER — HOSPITAL ENCOUNTER (OUTPATIENT)
Dept: INFUSION CENTER | Facility: HOSPITAL | Age: 40
Discharge: HOME | End: 2025-04-23
Attending: SURGERY

## 2025-04-24 ENCOUNTER — TELEPHONE (OUTPATIENT)
Dept: INFUSION CENTER | Facility: HOSPITAL | Age: 40
End: 2025-04-24

## 2025-04-24 NOTE — TELEPHONE ENCOUNTER
Pt called to cancel infusion appointment and did not wish to reschedule at this time. She confirmed she will let her doctors office know.

## 2025-05-02 ENCOUNTER — CLINICAL SUPPORT (OUTPATIENT)
Dept: BARIATRICS | Facility: CLINIC | Age: 40
End: 2025-05-02

## 2025-05-02 VITALS — BODY MASS INDEX: 27.32 KG/M2 | HEIGHT: 66 IN | WEIGHT: 170 LBS

## 2025-05-02 DIAGNOSIS — K91.2 POSTSURGICAL MALABSORPTION: Primary | ICD-10-CM

## 2025-05-02 PROCEDURE — RECHECK
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
## 2025-05-09 DIAGNOSIS — B37.2 INTERTRIGINOUS CANDIDIASIS: Primary | ICD-10-CM

## 2025-05-09 RX ORDER — NYSTATIN AND TRIAMCINOLONE ACETONIDE 100000; 1 [USP'U]/G; MG/G
OINTMENT TOPICAL 2 TIMES DAILY
Qty: 60 G | Refills: 2 | Status: SHIPPED | OUTPATIENT
Start: 2025-05-09

## 2025-06-09 ENCOUNTER — TELEPHONE (OUTPATIENT)
Dept: BARIATRICS | Facility: CLINIC | Age: 40
End: 2025-06-09

## 2025-06-09 DIAGNOSIS — Z98.84 WEIGHT GAIN FOLLOWING GASTRIC BYPASS SURGERY: ICD-10-CM

## 2025-06-09 DIAGNOSIS — R63.5 WEIGHT GAIN FOLLOWING GASTRIC BYPASS SURGERY: ICD-10-CM

## 2025-06-09 DIAGNOSIS — G43.909 MIGRAINES: ICD-10-CM

## 2025-06-09 RX ORDER — TOPIRAMATE 25 MG/1
25 TABLET, FILM COATED ORAL 2 TIMES DAILY
Qty: 180 TABLET | Refills: 0 | Status: SHIPPED | OUTPATIENT
Start: 2025-06-09 | End: 2025-09-07

## 2025-07-14 ENCOUNTER — APPOINTMENT (OUTPATIENT)
Dept: LAB | Facility: CLINIC | Age: 40
End: 2025-07-14
Attending: PHYSICIAN ASSISTANT
Payer: COMMERCIAL

## 2025-07-14 DIAGNOSIS — E61.1 IRON DEFICIENCY: ICD-10-CM

## 2025-07-14 DIAGNOSIS — K91.2 POSTSURGICAL MALABSORPTION: ICD-10-CM

## 2025-07-14 LAB
BASOPHILS # BLD AUTO: 0.06 THOUSANDS/ÂΜL (ref 0–0.1)
BASOPHILS NFR BLD AUTO: 1 % (ref 0–1)
EOSINOPHIL # BLD AUTO: 0.13 THOUSAND/ÂΜL (ref 0–0.61)
EOSINOPHIL NFR BLD AUTO: 2 % (ref 0–6)
ERYTHROCYTE [DISTWIDTH] IN BLOOD BY AUTOMATED COUNT: 13.4 % (ref 11.6–15.1)
FERRITIN SERPL-MCNC: 61 NG/ML (ref 30–307)
HCT VFR BLD AUTO: 42.3 % (ref 34.8–46.1)
HGB BLD-MCNC: 13.5 G/DL (ref 11.5–15.4)
IMM GRANULOCYTES # BLD AUTO: 0.03 THOUSAND/UL (ref 0–0.2)
IMM GRANULOCYTES NFR BLD AUTO: 0 % (ref 0–2)
IRON SATN MFR SERPL: 37 % (ref 15–50)
IRON SERPL-MCNC: 140 UG/DL (ref 50–212)
LYMPHOCYTES # BLD AUTO: 1.56 THOUSANDS/ÂΜL (ref 0.6–4.47)
LYMPHOCYTES NFR BLD AUTO: 21 % (ref 14–44)
MCH RBC QN AUTO: 30.9 PG (ref 26.8–34.3)
MCHC RBC AUTO-ENTMCNC: 31.9 G/DL (ref 31.4–37.4)
MCV RBC AUTO: 97 FL (ref 82–98)
MONOCYTES # BLD AUTO: 0.76 THOUSAND/ÂΜL (ref 0.17–1.22)
MONOCYTES NFR BLD AUTO: 10 % (ref 4–12)
NEUTROPHILS # BLD AUTO: 4.84 THOUSANDS/ÂΜL (ref 1.85–7.62)
NEUTS SEG NFR BLD AUTO: 66 % (ref 43–75)
NRBC BLD AUTO-RTO: 0 /100 WBCS
PLATELET # BLD AUTO: 257 THOUSANDS/UL (ref 149–390)
PMV BLD AUTO: 11.7 FL (ref 8.9–12.7)
RBC # BLD AUTO: 4.37 MILLION/UL (ref 3.81–5.12)
TIBC SERPL-MCNC: 376.6 UG/DL (ref 250–450)
TRANSFERRIN SERPL-MCNC: 269 MG/DL (ref 203–362)
UIBC SERPL-MCNC: 237 UG/DL (ref 155–355)
WBC # BLD AUTO: 7.38 THOUSAND/UL (ref 4.31–10.16)

## 2025-07-14 PROCEDURE — 85025 COMPLETE CBC W/AUTO DIFF WBC: CPT

## 2025-07-14 PROCEDURE — 36415 COLL VENOUS BLD VENIPUNCTURE: CPT

## 2025-07-14 PROCEDURE — 82728 ASSAY OF FERRITIN: CPT

## 2025-07-14 PROCEDURE — 83540 ASSAY OF IRON: CPT

## 2025-07-14 PROCEDURE — 83550 IRON BINDING TEST: CPT

## 2025-07-15 DIAGNOSIS — E61.1 IRON DEFICIENCY: ICD-10-CM

## 2025-07-15 DIAGNOSIS — Z00.6 ENCOUNTER FOR EXAMINATION FOR NORMAL COMPARISON OR CONTROL IN CLINICAL RESEARCH PROGRAM: ICD-10-CM

## 2025-07-15 DIAGNOSIS — K91.2 POSTSURGICAL MALABSORPTION: Primary | ICD-10-CM

## 2025-08-05 ENCOUNTER — APPOINTMENT (OUTPATIENT)
Dept: LAB | Facility: CLINIC | Age: 40
End: 2025-08-05
Attending: PATHOLOGY

## 2025-08-05 DIAGNOSIS — Z00.6 ENCOUNTER FOR EXAMINATION FOR NORMAL COMPARISON OR CONTROL IN CLINICAL RESEARCH PROGRAM: ICD-10-CM

## 2025-08-05 PROCEDURE — 36415 COLL VENOUS BLD VENIPUNCTURE: CPT

## 2025-08-17 LAB
APOB+LDLR+PCSK9 GENE MUT ANL BLD/T: NOT DETECTED
BRCA1+BRCA2 DEL+DUP + FULL MUT ANL BLD/T: NOT DETECTED
MLH1+MSH2+MSH6+PMS2 GN DEL+DUP+FUL M: NOT DETECTED